# Patient Record
Sex: FEMALE | Race: WHITE
[De-identification: names, ages, dates, MRNs, and addresses within clinical notes are randomized per-mention and may not be internally consistent; named-entity substitution may affect disease eponyms.]

---

## 2018-11-19 ENCOUNTER — HOSPITAL ENCOUNTER (OUTPATIENT)
Dept: HOSPITAL 47 - RADUSWWP | Age: 73
End: 2018-11-19
Attending: INTERNAL MEDICINE
Payer: MEDICARE

## 2018-11-19 DIAGNOSIS — I70.90: Primary | ICD-10-CM

## 2018-11-19 PROCEDURE — 93880 EXTRACRANIAL BILAT STUDY: CPT

## 2018-11-20 NOTE — US
EXAMINATION TYPE: US carotid duplex BILAT

 

DATE OF EXAM: 11/19/2018

 

COMPARISON: NONE

 

CLINICAL HISTORY: H53.9 UNSPECIFIED VISUAL DISTURBANCE.

 

 

 

Rt side high bifurcation

EXAM MEASUREMENTS: 

 

RIGHT:  Peak Systolic Velocity (PSV) cm/sec

----- Right CCA:  93.0  

----- Right ICA:  104.3     

----- Right ECA:  76.5   

ICA/CCA ratio:  1.1    

 

RIGHT:  End Diastole cm/sec

----- Right CCA:  33.6   

----- Right ICA:  38.7      

----- Right ECA:  14.7     

 

LEFT:  Peak Systolic Velocity (PSV) cm/sec

----- Left CCA:  93.0  

----- Left ICA:  89.0   

----- Left ECA:  58.9  

ICA/CCA ratio:  1.0  

 

LEFT:  End Diastole cm/sec

----- Left CCA:  32.3  

----- Left ICA:  32.6   

----- Left ECA:  8.3 

 

VERTEBRALS (direction of flow):

Right Vertebral: Antegrade

Left Vertebral: Antegrade

 

Rhythm:  Normal

 

Mild atheromatous plaquing is seen within the carotid bulbs.

 

 

 

IMPRESSION:  Mild degree of grayscale atheromatous plaquing with no sonographically evident hemodynam
ically significant stenosis within either visualized carotid arterial system.   

 

 

Criteria for Assigning % of Stenosis / Diameter reduction

(Estimation based on the indirect measurements of the internal carotid artery velocities (ICA PSV).

1.  Normal (no stenosis)=ICA PSV < 125 cm/s: ratio < 2.0: ICA EDV<40 cm/s.

2. Less than 50% stenosis=ICA PSV < 125 cm/s: ratio < 2.0: ICA EDV<40 cm/s.

3.  50 to 69% stenosis=ICA PSV of 125 to 230 cm/s: ration 2.0 ? 4.0: ICA EDV  cm/s.

4.  Greater than 70% stenosis to near occlusion= ICA PSV > 230 cm/s: ratio > 4.0: ICA EDV > 100 cm/s.
 

5.  Near occlusion= ICA PSV velocities may be low or undetectable: variable ratio and ICA EDV.

6.  Total occlusion=unable to detect flow.

## 2020-03-06 ENCOUNTER — HOSPITAL ENCOUNTER (OUTPATIENT)
Dept: HOSPITAL 47 - RADMAMWWP | Age: 75
Discharge: HOME | End: 2020-03-06
Attending: INTERNAL MEDICINE
Payer: MEDICARE

## 2020-03-06 DIAGNOSIS — Z12.31: Primary | ICD-10-CM

## 2020-03-06 PROCEDURE — 77067 SCR MAMMO BI INCL CAD: CPT

## 2020-03-06 PROCEDURE — 77063 BREAST TOMOSYNTHESIS BI: CPT

## 2020-03-09 NOTE — MM
Reason for exam: screening  (asymptomatic).

Last mammogram was performed 5 years and 5 months ago.



History:

Patient is postmenopausal.

Took estrogen for 38 years 4 months beginning at age 31.



Physical Findings:

A clinical breast exam by your physician is recommended on an annual basis and 

results should be correlated with mammographic findings.



MG 3D Screening Mammo W/Cad

Bilateral CC and MLO view(s) were taken.

Prior study comparison: October 9, 2014, bilateral MG screening mammo w CAD.  

August 31, 2012, [facility] DIGITAL RIGHT MAMMOGRAM w/CAD.

The breast tissue is heterogeneously dense. This may lower the sensitivity of 

mammography.  Benign appearing calcifications in the left breast. No suspicious 

abnormality.  No significant changes when compared with prior studies.





ASSESSMENT: Benign, BI-RAD 2



RECOMMENDATION:

Routine screening mammogram of both breasts in 1 year.

## 2021-02-12 ENCOUNTER — HOSPITAL ENCOUNTER (OUTPATIENT)
Dept: HOSPITAL 47 - ORWHC2ENDO | Age: 76
Discharge: HOME | End: 2021-02-12
Attending: INTERNAL MEDICINE
Payer: MEDICARE

## 2021-02-12 VITALS — BODY MASS INDEX: 24 KG/M2

## 2021-02-12 VITALS — DIASTOLIC BLOOD PRESSURE: 56 MMHG | SYSTOLIC BLOOD PRESSURE: 128 MMHG

## 2021-02-12 VITALS — TEMPERATURE: 98 F | RESPIRATION RATE: 16 BRPM

## 2021-02-12 VITALS — HEART RATE: 58 BPM

## 2021-02-12 DIAGNOSIS — K44.9: ICD-10-CM

## 2021-02-12 DIAGNOSIS — R07.89: ICD-10-CM

## 2021-02-12 DIAGNOSIS — Z79.899: ICD-10-CM

## 2021-02-12 DIAGNOSIS — K29.00: Primary | ICD-10-CM

## 2021-02-12 LAB — GLUCOSE BLD-MCNC: 108 MG/DL (ref 75–99)

## 2021-02-12 PROCEDURE — 43239 EGD BIOPSY SINGLE/MULTIPLE: CPT

## 2021-02-12 PROCEDURE — 88305 TISSUE EXAM BY PATHOLOGIST: CPT

## 2021-02-12 NOTE — P.PCN
Date of Procedure: 02/12/21


Procedure(s) Performed: 


BRIEF HISTORY: Patient is a 76-year-old, pleasant, white female scheduled for an

upper endoscopy as a part of evaluation of atypical chest pain for the last 2 

weeks' duration..  She went to the ER 2 weeks ago and had cardiac workup done 

that was negative.  She denies any heartburn.  Denies any dysphagia or 

odynophagia.  No upper GI symptoms.





PROCEDURE PERFORMED: Esophagogastroduodenoscopy with biopsy..





PREOPERATIVE DIAGNOSIS: Atypical chest pain of 2 weeks' duration. 





IV sedation per anesthesia. 





PROCEDURE: After informed consent was obtained, the patient  was brought into 

the endoscopy unit. IV sedation was administered by Anesthesia under continuous 

monitoring. Initially the Olympus GIF-140 video endoscope was inserted into the 

mouth. Esophagus intubated without any difficulty. It was gradually advanced 

into the stomach and duodenum and carefully examined. The bulb and the second 

part of the duodenum appeared normal. The scope at this time was withdrawn to 

the stomach, adequately insufflated with air, and upon careful examination, 

mucosa of the antrum had multiple scattered erosions and biopsies were done from

this area.  The body, cardia and the fundus appeared normal. The scope was then 

withdrawn into the esophagus. The GE junction was located at 39 cm from the 

incisors.  There was a small sliding type hiatal hernia noted.  The esophagus 

appeared normal. There were no erosions or ulcerations seen, biopsies were done 

from the distal esophagus and the patient tolerated the procedure well. 





IMPRESSION: 


1.  Antral erosive gastritis.


2.  Very small sliding type hiatal hernia but no evidence of esophagitis.





RECOMMENDATIONS: The findings of this examination were discussed with the 

patient as well as her family.  She was advised to follow with the biopsy 

results.  In the meantime she was given her perception for Prilosec 40 mg daily 

to be taken half hour before breakfast for 4 weeks.  She was advised to follow 

with office in a month.

## 2023-04-20 ENCOUNTER — HOSPITAL ENCOUNTER (OUTPATIENT)
Dept: HOSPITAL 47 - RADECHMAIN | Age: 78
Discharge: HOME | End: 2023-04-20
Attending: FAMILY MEDICINE
Payer: MEDICARE

## 2023-04-20 DIAGNOSIS — R55: Primary | ICD-10-CM

## 2023-04-20 PROCEDURE — 93306 TTE W/DOPPLER COMPLETE: CPT

## 2023-04-24 NOTE — CA
Transthoracic Echo Report 

 Name: Shira Burch 

 MRN:    [id number] 

 Age:    78     Gender:     F 

 

 :    1945 

 Exam Date:     2023 14:58 

 Exam Location: [facility] 

 Ht (in):     63     Wt (lb):     139 

 Ordering Physician:        Collins Vitale MD 

 Attending/Referring Phys:         Kelsie Victoria PAC 

 Technician         Harriett Dawn RDCS 

 Procedure CPT: 

 Indications:       r55 

 

 Cardiac Hx: 

 Technical Quality:      Fair 

 Contrast 1:                                Total Dose (mL): 

 Contrast 2:                                Total Dose (mL): 

 

 MEASUREMENTS  (Male / Female) Normal Values 

 2D ECHO 

 LV Diastolic Diameter PLAX        3.0 cm                4.2 - 5.9 / 3.9 - 5.3 cm 

 LV Systolic Diameter PLAX         2.1 cm                 

 IVS Diastolic Thickness           1.3 cm                0.6 - 1.0 / 0.6 - 0.9 cm 

 LVPW Diastolic Thickness          1.6 cm                0.6 - 1.0 / 0.6 - 0.9 cm 

 LV Relative Wall Thickness        1.0                    

 RV Internal Dim ED PLAX           3.0 cm                 

 LA Volume                         46.5 cm???              18 - 58 / 22 - 52 cm??? 

 

 M-MODE 

 Aortic Root Diameter MM           2.6 cm                 

 LA Systolic Diameter MM           3.3 cm                 

 LA Ao Ratio MM                    1.3                    

 AV Cusp Separation MM             1.7 cm                 

 

 DOPPLER 

 AV Peak Velocity                  151.4 cm/s             

 AV Peak Gradient                  9.2 mmHg               

 AV Mean Velocity                  102.2 cm/s             

 AV Mean Gradient                  4.7 mmHg               

 AV Velocity Time Integral         35.4 cm                

 LVOT Peak Velocity                123.4 cm/s             

 LVOT Peak Gradient                6.1 mmHg               

 LVOT Velocity Time Integral       27.5 cm                

 MV Area PHT                       3.9 cm???                

 Mitral E Point Velocity           85.1 cm/s              

 Mitral A Point Velocity           118.6 cm/s             

 Mitral E to A Ratio               0.7                    

 MV Deceleration Time              195.7 ms               

 MV E' Velocity                    4.9 cm/s               

 Mitral E to MV E' Ratio           17.3                   

 TR Peak Velocity                  250.5 cm/s             

 TR Peak Gradient                  25.1 mmHg              

 Right Ventricular Systolic Press  30.0 mmHg              

 

 

 FINDINGS 

 Left Ventricle 

 Mildly increased left ventricular wall thickness. Left ventricular cavity size  

 normal. Normal left ventricular systolic function with no obvious regional wall  

 motion abnormalities. Left ventricular ejection fraction is estimated at 55-60  

 %. 

 

 Right Ventricle 

 Normal right ventricular size and function. Right ventricular systolic pressure  

 within normal limits. 

 

 Right Atrium 

 Normal right atrial size. 

 

 Left Atrium 

 Normal left atrial size. 

 

 Mitral Valve 

 Structurally normal mitral valve. No mitral stenosis, regurgitation or  

 prolapse. 

 

 Aortic Valve 

 Trileaflet aortic valve. No aortic valve stenosis or regurgitation. 

 

 Tricuspid Valve 

 Structurally normal tricuspid valve. Mild tricuspid regurgitation. 

 

 Pulmonic Valve 

 Structurally normal pulmonic valve. 

 

 Pericardium 

 No pericardial effusion. 

 

 Aorta 

 Normal size aortic root and proximal ascending aorta. 

 

 CONCLUSIONS 

 Normal biventricular dimension and systolic function 

 Mild concentric LVH 

 The LV systolic function is 55-60% 

 No significant valvular abnormalities 

 Previewed by:  

 Dr. Mauricio Holden MD 

 (Electronically Signed) 

 Final Date:      2023 10:25

## 2023-10-19 ENCOUNTER — HOSPITAL ENCOUNTER (OUTPATIENT)
Dept: HOSPITAL 47 - RADBDWWP | Age: 78
Discharge: HOME | End: 2023-10-19
Payer: MEDICARE

## 2023-10-19 DIAGNOSIS — Z78.0: ICD-10-CM

## 2023-10-19 DIAGNOSIS — M85.89: Primary | ICD-10-CM

## 2023-10-19 PROCEDURE — 77080 DXA BONE DENSITY AXIAL: CPT

## 2023-10-19 NOTE — BD
EXAMINATION TYPE: Axial Bone Density

 

DATE OF EXAM: 10/19/2023

 

CLINICAL HISTORY: 78 years old Female.  ICD-10 CODE: Z78.0 ASYMPTOMATIC MENOPAUSAL STATE

 

Height:  63

Weight:  140

 

FRAX RISK QUESTIONS:

History of Fracture in Adulthood: yes

Secondary Osteoporosis: yes

  3.  Menopause before 45: yes

  

RISK FACTORS 

HISTORY OF: 

hx of rib fxs 2 mos ago.

Postmenopausal woman: total hyst. at age 31

Take estrogen and/or progesterone medications: yes from age 31, and still taking

How lon yrs

Hyperparathyroidism: no

Adrenal Insufficiency: no

 

MEDICATIONS: 

Additional Medications: estrodyle and progesterone, magnesium and vit d 

Additional History: early menopause, fx as an adult, hormones, hx of arthritis, fibromyalgia, RA fact
or in blood test  

 

EXAM MEASUREMENTS: 

Bone mineral densitometry was performed using the [employer] System.

Bone mineral density as measured about the Lumbar spine is:  

----- L1-L4(G/cm2):   1.148

T Score Values are as follows:

----- L1:   -1.4

----- L2:   -1.3

----- L3:    0.4

----- L4:    0.6

----- L1-L4:   -0.3

 

Z Score Values are as follows:

----- L1:    0.4

----- L2:    0.6

----- L3:    2.3

----- L4:    2.5

----- L1-L4:    1.6

 

Bone mineral density is her first dexa study at [facility].

 

Bone mineral density about the R hip (g/cm2): 0.857

Bone mineral density about the L hip (g/cm2):  0.901

T Score values are as follows:

-----R Neck:   -1.5

-----L Neck:   -1.3

-----R Total:   -1.2

-----L Total:   -0.8

 

Z Score values are as follows:

-----R Neck:    0.6

-----L Neck:    0.7

-----R Total:    0.7

-----L Total:    1.1

 

Bone mineral density is her first bone density at [facility].

 

FRAX%s: The graph provided illustrates a 18.6% chance for a major osteoporotic fx and a 3.8% chance f
or the hips probability for fx in 10 years time.

 

IMPRESSION:

Osteopenia (T Score between -2.5 and -1).

 

There is slightly increased risk of fracture and the patient may be considered 

for treatment. 

 

Re-Screen 2-5 years.

 

NOTE:  T-SCORE=SD OF THE YOUNG ADULT MEAN.